# Patient Record
Sex: MALE | URBAN - METROPOLITAN AREA
[De-identification: names, ages, dates, MRNs, and addresses within clinical notes are randomized per-mention and may not be internally consistent; named-entity substitution may affect disease eponyms.]

---

## 2024-10-02 ENCOUNTER — OFFICE VISIT (OUTPATIENT)
Dept: URGENT CARE | Age: 24
End: 2024-10-02
Payer: COMMERCIAL

## 2024-10-02 VITALS
TEMPERATURE: 97.9 F | DIASTOLIC BLOOD PRESSURE: 72 MMHG | HEART RATE: 92 BPM | SYSTOLIC BLOOD PRESSURE: 114 MMHG | OXYGEN SATURATION: 98 % | RESPIRATION RATE: 12 BRPM

## 2024-10-02 DIAGNOSIS — H02.844 EDEMA OF LEFT UPPER EYELID: Primary | ICD-10-CM

## 2024-10-02 RX ORDER — LISINOPRIL 5 MG/1
5 TABLET ORAL
COMMUNITY
Start: 2024-08-05

## 2024-10-02 RX ORDER — OMEPRAZOLE 40 MG/1
40 CAPSULE, DELAYED RELEASE ORAL
COMMUNITY
Start: 2024-07-01

## 2024-10-02 RX ORDER — LEVOTHYROXINE SODIUM 75 UG/1
75 TABLET ORAL
COMMUNITY
Start: 2024-08-26

## 2024-10-02 RX ORDER — ERYTHROMYCIN 5 MG/G
OINTMENT OPHTHALMIC 4 TIMES DAILY
Qty: 3.5 G | Refills: 0 | Status: SHIPPED | OUTPATIENT
Start: 2024-10-02 | End: 2024-10-09

## 2024-10-02 RX ORDER — ESOMEPRAZOLE MAGNESIUM 40 MG/1
40 CAPSULE, DELAYED RELEASE ORAL
COMMUNITY

## 2024-10-02 RX ORDER — BUDESONIDE 3 MG/1
CAPSULE, COATED PELLETS ORAL
COMMUNITY
Start: 2024-07-11

## 2024-10-02 ASSESSMENT — VISUAL ACUITY: OU: 1

## 2024-10-02 ASSESSMENT — ENCOUNTER SYMPTOMS
EYE REDNESS: 0
HEADACHES: 0
NAUSEA: 0
EYE DISCHARGE: 0
DIZZINESS: 0
VOMITING: 0
PHOTOPHOBIA: 0
FEVER: 0
CHILLS: 0
EYE PAIN: 0

## 2024-10-02 NOTE — PROGRESS NOTES
Subjective   Patient ID: Mingo Chand is a 24 y.o. male. They present today with a chief complaint of Eye Problem (Left eye lid swelling x 1 day).    HISTORY OF PRESENT ILLNESS:    Patient presents to the clinic for left upper eyelid redness and swelling x 1 day. Reports mild itching yesterday but currently resolved. Denies pain. States swelling worsened upon waking this AM. Denies injury or trauma. Denies known food allergies. Denies known insect bite. Feeling well otherwise.     Past Medical History  Allergies as of 10/02/2024 - Reviewed 10/02/2024   Allergen Reaction Noted    Cephalosporins Dermatitis and Hives 01/03/2001    Iodinated contrast media Hives and Nausea/vomiting 12/15/2008       Current Outpatient Medications   Medication Instructions    budesonide EC (Entocort EC) 3 mg 24 hr capsule TAKE 3 CAPSULES (9 MG TOTAL) BY MOUTH EVERY MORNING. INDICATIONS: INTESTINAL MALABSORPTION    erythromycin (Romycin) 5 mg/gram (0.5 %) ophthalmic ointment ophthalmic (eye), 4 times daily    esomeprazole (NEXIUM) 40 mg, oral, Daily RT    levothyroxine (SYNTHROID, LEVOXYL) 75 mcg, oral, Daily RT    lisinopril 5 mg, oral, Daily RT    omeprazole (PRILOSEC) 40 mg, oral, Daily RT         No past medical history on file.    No past surgical history on file.     reports that he has never smoked. He has never used smokeless tobacco.    Review of Systems    Review of Systems   Constitutional:  Negative for chills and fever.   Eyes:  Negative for photophobia, pain, discharge, redness and visual disturbance.        (+) left upper eyelid redness, swelling   Gastrointestinal:  Negative for nausea and vomiting.   Neurological:  Negative for dizziness and headaches.   All other systems reviewed and are negative.                                Objective    Vitals:    10/02/24 1030   BP: 114/72   Pulse: 92   Resp: 12   Temp: 36.6 °C (97.9 °F)   SpO2: 98%     No LMP for male patient.  PHYSICAL EXAMINATION:    Physical Exam  Vitals and  nursing note reviewed.   Constitutional:       General: He is not in acute distress.     Appearance: Normal appearance. He is not ill-appearing.   HENT:      Head: Normocephalic and atraumatic.      Nose: Nose normal.   Eyes:      General: Vision grossly intact.         Right eye: No discharge.         Left eye: No discharge.      Extraocular Movements: Extraocular movements intact.      Right eye: Normal extraocular motion.      Left eye: Normal extraocular motion.      Conjunctiva/sclera: Conjunctivae normal.      Right eye: Right conjunctiva is not injected.      Left eye: Left conjunctiva is not injected.      Comments: (+) mild to moderate left upper lid edema and erythema, diffusely involving entire lid. No appreciable styes or other growths along lid margin internally or externally.    No left periorbital tenderness, erythema, induration, warmth, or swelling.   Cardiovascular:      Rate and Rhythm: Normal rate.   Pulmonary:      Effort: Pulmonary effort is normal. No respiratory distress.   Musculoskeletal:      Cervical back: Normal range of motion and neck supple.   Skin:     General: Skin is warm and dry.   Neurological:      General: No focal deficit present.      Mental Status: He is alert and oriented to person, place, and time.   Psychiatric:         Mood and Affect: Mood normal.         Behavior: Behavior normal.          Procedures    No results found for this visit on 10/02/24.    Diagnostic study results (if any) were reviewed by Apurva Pro PA-C.    Assessment/Plan   Allergies, medications, history, and pertinent labs/EKGs/Imaging reviewed by Apurva Pro PA-C.     Orders and Diagnoses  Diagnoses and all orders for this visit:  Edema of left upper eyelid  -     erythromycin (Romycin) 5 mg/gram (0.5 %) ophthalmic ointment; Apply to affected eye(s) 4 times a day for 7 days.      Medical Admin Record    Given overall well appearance, vital signs, history, and exam as above, there is no  indication for further emergent testing/intervention at this time.      I discussed with the patient my clinical thoughts at this time given the above and we had a shared decision-making conversation in a patient-centered decision-making model on how to proceed forward. Pt was instructed on the importance of close follow-up. They were told that an urgent care diagnosis is often a preliminary impression and that definitive care is often not able to be given in the urgent care setting. Pt was educated that close follow-up is essential for good health and good outcomes. Patient was provided with the following instructions:         Use antibiotic ointment as directed.       Recommend OTC Benadryl and Zyrtec to account for possible allergy.     Warm water and baby shampoo for gentle lid cleansing.     Alternate warm and cool compresses.     Good hand hygiene. Avoid touching eyes as much as able.     Follow up with PCP or RTC in the next 5-7 days if sx fail to show adequate improvement.         Clinical impression as well as limitations of available testing/examination, all discussed with patient. Pt is well at this time in the urgent care. They are comfortable with the present assessment and plan to be discharged home. Discussed with them close outpatient follow up, reassessment, and possible further testing/treatment via their PCP/specialist if symptoms fail to improve; they agree, understand, and are comfortable with this plan. Pt given the opportunity to ask questions prior to discharge and all questions were answered at this time. Via our discussion, the patient was advised of warning signs and instructions were reviewed. Strict ED precautions were given, acknowledged, and understood. Discussed with the patient/family that it is okay to return to the urgent care at any time for anything. Advised to present to the ED if present condition changes/worsens or if they develop new symptoms at any time after discharge.  Also, advised to go to the ED if they cannot establish outpatient follow-up in time frame specified above. Pt verbalized understanding and agreement with plan and instructions. Discussed the need for close follow up with their primary care provider and/or specialist for further testing/treatment/care/consultation in the short time frame as noted above, if needed - they understand these instructions and agree to close follow up for these reasons. Patient discharged home in stable condition.      Follow Up Instructions  No follow-ups on file.    Patient disposition: Home    Electronically signed by Apurva Pro PA-C  11:33 AM